# Patient Record
Sex: FEMALE | Race: WHITE | Employment: OTHER | URBAN - METROPOLITAN AREA
[De-identification: names, ages, dates, MRNs, and addresses within clinical notes are randomized per-mention and may not be internally consistent; named-entity substitution may affect disease eponyms.]

---

## 2017-12-17 ENCOUNTER — APPOINTMENT (OUTPATIENT)
Dept: GENERAL RADIOLOGY | Age: 77
End: 2017-12-17
Attending: EMERGENCY MEDICINE
Payer: MEDICARE

## 2017-12-17 ENCOUNTER — HOSPITAL ENCOUNTER (OUTPATIENT)
Age: 77
Discharge: HOME | End: 2017-12-17
Attending: EMERGENCY MEDICINE
Payer: MEDICARE

## 2017-12-17 VITALS
SYSTOLIC BLOOD PRESSURE: 133 MMHG | WEIGHT: 150 LBS | TEMPERATURE: 99 F | OXYGEN SATURATION: 96 % | HEART RATE: 80 BPM | DIASTOLIC BLOOD PRESSURE: 74 MMHG | RESPIRATION RATE: 20 BRPM

## 2017-12-17 DIAGNOSIS — J40 BRONCHITIS: ICD-10-CM

## 2017-12-17 DIAGNOSIS — J45.21 MILD INTERMITTENT ASTHMA WITH EXACERBATION: Primary | ICD-10-CM

## 2017-12-17 PROCEDURE — 71020 XR CHEST PA + LAT CHEST (CPT=71020): CPT | Performed by: EMERGENCY MEDICINE

## 2017-12-17 PROCEDURE — 99204 OFFICE O/P NEW MOD 45 MIN: CPT

## 2017-12-17 PROCEDURE — 94640 AIRWAY INHALATION TREATMENT: CPT

## 2017-12-17 RX ORDER — ALBUTEROL SULFATE 90 UG/1
2 AEROSOL, METERED RESPIRATORY (INHALATION) EVERY 4 HOURS PRN
Qty: 1 INHALER | Refills: 0 | Status: SHIPPED | OUTPATIENT
Start: 2017-12-17 | End: 2018-01-16

## 2017-12-17 RX ORDER — FLUTICASONE PROPIONATE AND SALMETEROL 100; 50 UG/1; UG/1
1 POWDER RESPIRATORY (INHALATION) 2 TIMES DAILY
COMMUNITY

## 2017-12-17 RX ORDER — CITALOPRAM 10 MG/1
10 TABLET ORAL DAILY
COMMUNITY

## 2017-12-17 RX ORDER — PREDNISONE 20 MG/1
40 TABLET ORAL DAILY
Qty: 10 TABLET | Refills: 0 | Status: SHIPPED | OUTPATIENT
Start: 2017-12-17 | End: 2017-12-22

## 2017-12-17 RX ORDER — METHYLPHENIDATE HYDROCHLORIDE 10 MG/1
10 CAPSULE, EXTENDED RELEASE ORAL EVERY MORNING
COMMUNITY

## 2017-12-17 RX ORDER — IPRATROPIUM BROMIDE AND ALBUTEROL SULFATE 2.5; .5 MG/3ML; MG/3ML
3 SOLUTION RESPIRATORY (INHALATION) ONCE
Status: COMPLETED | OUTPATIENT
Start: 2017-12-17 | End: 2017-12-17

## 2017-12-17 RX ORDER — IBUPROFEN 400 MG/1
400 TABLET ORAL EVERY 6 HOURS PRN
COMMUNITY
End: 2022-01-06

## 2017-12-17 RX ORDER — BENZONATATE 200 MG/1
200 CAPSULE ORAL 3 TIMES DAILY PRN
Qty: 30 CAPSULE | Refills: 0 | Status: SHIPPED | OUTPATIENT
Start: 2017-12-17 | End: 2018-01-16

## 2017-12-17 RX ORDER — ALBUTEROL SULFATE 90 UG/1
AEROSOL, METERED RESPIRATORY (INHALATION) EVERY 6 HOURS PRN
COMMUNITY

## 2017-12-17 RX ORDER — LEVOTHYROXINE SODIUM 0.07 MG/1
75 TABLET ORAL
COMMUNITY

## 2017-12-17 RX ORDER — DOXYCYCLINE HYCLATE 100 MG
100 TABLET ORAL 2 TIMES DAILY
Qty: 20 TABLET | Refills: 0 | Status: SHIPPED | OUTPATIENT
Start: 2017-12-17 | End: 2017-12-27

## 2017-12-17 NOTE — ED PROVIDER NOTES
Patient presents with:  Cough/URI    HPI:     Dominic Goff is a 78year old female who presents with chief complaint of cough. Started 2 weeks ago with cough. Cough is now associated with bronchospasm.   Hx of asthma, has not been on steroids in over a ye Normal size cardiac silhouette. MEDIASTINUM:  Normal. PLEURA:  Normal.  No pleural effusions. BONES:  Normal for age. CONCLUSION:   No consolidation. A 5 mm nodule over the superior segment right lower lobe is likely a calcified granuloma.     Dictate

## 2017-12-17 NOTE — ED INITIAL ASSESSMENT (HPI)
Cough for 2 weeks, no fevers, cough keeping her up at night, no sore throat, her  has been sick with a cough too.

## 2021-06-10 ENCOUNTER — TELEPHONE (OUTPATIENT)
Dept: NEPHROLOGY | Age: 81
End: 2021-06-10

## 2022-01-06 ENCOUNTER — HOSPITAL ENCOUNTER (OUTPATIENT)
Age: 82
Discharge: HOME | End: 2022-01-06
Attending: FAMILY MEDICINE
Payer: MEDICARE

## 2022-01-06 VITALS
WEIGHT: 137 LBS | HEART RATE: 64 BPM | SYSTOLIC BLOOD PRESSURE: 160 MMHG | RESPIRATION RATE: 18 BRPM | DIASTOLIC BLOOD PRESSURE: 69 MMHG | TEMPERATURE: 98 F | HEIGHT: 62 IN | BODY MASS INDEX: 25.21 KG/M2 | OXYGEN SATURATION: 94 %

## 2022-01-06 DIAGNOSIS — R11.0 NAUSEA: ICD-10-CM

## 2022-01-06 DIAGNOSIS — R03.0 ELEVATED BLOOD PRESSURE READING: ICD-10-CM

## 2022-01-06 DIAGNOSIS — U07.1 COVID-19 VIRUS INFECTION: Primary | ICD-10-CM

## 2022-01-06 LAB — SARS-COV-2 RNA RESP QL NAA+PROBE: DETECTED

## 2022-01-06 PROCEDURE — 99203 OFFICE O/P NEW LOW 30 MIN: CPT | Performed by: FAMILY MEDICINE

## 2022-01-06 PROCEDURE — U0002 COVID-19 LAB TEST NON-CDC: HCPCS | Performed by: FAMILY MEDICINE

## 2022-01-06 RX ORDER — ONDANSETRON 4 MG/1
4 TABLET, ORALLY DISINTEGRATING ORAL EVERY 8 HOURS PRN
Qty: 6 TABLET | Refills: 0 | Status: SHIPPED | OUTPATIENT
Start: 2022-01-06 | End: 2022-01-08

## 2022-01-06 NOTE — ED PROVIDER NOTES
Patient Seen in: [facility]      History   Patient presents with:  Nausea/vomiting    Stated Complaint: vomitting, fatigue    Subjective:   HPI  This is a 19-year-old female coming in with complaints of 2 episodes of vomiting while she was gary FROM, supple  LUNGS: End expiratory wheeze noted over lower lung fields, thick coarse crackles associated with cough spasms heard  CV: RRR  ABD: not distended  NEURO: Alert and oriented to person place and time  GAIT: Normal        ED Course     Labs Revie 5. Eating small frequent light meals will avoid any stomach issues  6. Breathing exercises 5 minutes each time and do this 3 times per day   7. Monitor pulse Ox at home       Stop methylphenidate.     Disposition and Plan     Clinical Impression:  XMPVA-8

## 2024-05-24 ENCOUNTER — APPOINTMENT (OUTPATIENT)
Dept: GENERAL RADIOLOGY | Age: 84
End: 2024-05-24
Attending: NURSE PRACTITIONER

## 2024-05-24 ENCOUNTER — HOSPITAL ENCOUNTER (OUTPATIENT)
Age: 84
Discharge: HOME | End: 2024-05-24

## 2024-05-24 VITALS
TEMPERATURE: 98 F | RESPIRATION RATE: 18 BRPM | DIASTOLIC BLOOD PRESSURE: 62 MMHG | OXYGEN SATURATION: 95 % | BODY MASS INDEX: 24.84 KG/M2 | HEART RATE: 67 BPM | SYSTOLIC BLOOD PRESSURE: 176 MMHG | HEIGHT: 62 IN | WEIGHT: 135 LBS

## 2024-05-24 DIAGNOSIS — M10.9 ACUTE GOUT INVOLVING TOE OF RIGHT FOOT, UNSPECIFIED CAUSE: Primary | ICD-10-CM

## 2024-05-24 PROCEDURE — 99204 OFFICE O/P NEW MOD 45 MIN: CPT | Performed by: NURSE PRACTITIONER

## 2024-05-24 PROCEDURE — 73660 X-RAY EXAM OF TOE(S): CPT | Performed by: NURSE PRACTITIONER

## 2024-05-24 RX ORDER — FLUOXETINE HYDROCHLORIDE 40 MG/1
40 CAPSULE ORAL DAILY
COMMUNITY
Start: 2022-05-14

## 2024-05-24 RX ORDER — METHYLPREDNISOLONE 4 MG/1
TABLET ORAL
Qty: 1 EACH | Refills: 0 | Status: SHIPPED | OUTPATIENT
Start: 2024-05-24

## 2024-05-24 RX ORDER — FAMOTIDINE 40 MG/1
40 TABLET, FILM COATED ORAL 2 TIMES DAILY
COMMUNITY
Start: 2022-04-19

## 2024-05-24 RX ORDER — SOLIFENACIN SUCCINATE 10 MG/1
10 TABLET, FILM COATED ORAL DAILY
COMMUNITY
Start: 2024-03-15

## 2024-05-24 NOTE — ED PROVIDER NOTES
Patient Seen in: [facility]      History     Chief Complaint   Patient presents with    Toe Pain     Stated Complaint: toe issues    Subjective:   This is an 83-year-old female with below stated medical history.  Presents to immediate care for right great toe pain.  Reports toe became red and swollen 1 week ago.  Denies any trauma or injury.  Denies any numbness or tingling.  Denies any treatment prior to arrival.    The history is provided by the patient.           Objective:   No pertinent past medical history.            No pertinent past surgical history.              No pertinent social history.            Review of Systems   Constitutional:  Negative for chills and fever.   HENT:  Negative for congestion and sore throat.    Respiratory:  Negative for cough.    Cardiovascular:  Negative for chest pain and leg swelling.   Gastrointestinal:  Negative for abdominal pain.   Musculoskeletal:  Positive for arthralgias and joint swelling. Negative for back pain, neck pain and neck stiffness.   Skin:  Negative for rash.   Allergic/Immunologic: Negative for environmental allergies.   Neurological:  Negative for numbness and headaches.   Hematological:  Does not bruise/bleed easily.       Positive for stated complaint: toe issues  Other systems are as noted in HPI.  Constitutional and vital signs reviewed.      All other systems reviewed and negative except as noted above.    Physical Exam     ED Triage Vitals [05/24/24 1606]   BP (!) 176/62   Pulse 67   Resp 18   Temp 98.2 °F (36.8 °C)   Temp src Temporal   SpO2 95 %   O2 Device None (Room air)       Current Vitals:   Vital Signs  BP: (!) 176/62  Pulse: 67  Resp: 18  Temp: 98.2 °F (36.8 °C)  Temp src: Temporal    Oxygen Therapy  SpO2: 95 %  O2 Device: None (Room air)            Physical Exam  Vitals and nursing note reviewed.   Constitutional:       General: She is not in acute distress.     Appearance: Normal appearance. She is not ill-appearing,  toxic-appearing or diaphoretic.   HENT:      Head: Normocephalic and atraumatic.      Right Ear: External ear normal.      Left Ear: External ear normal.      Nose: Nose normal.      Mouth/Throat:      Mouth: Mucous membranes are moist.      Pharynx: Oropharynx is clear.   Eyes:      General:         Right eye: No discharge.         Left eye: No discharge.      Extraocular Movements: Extraocular movements intact.      Conjunctiva/sclera: Conjunctivae normal.   Cardiovascular:      Rate and Rhythm: Normal rate.   Pulmonary:      Effort: Pulmonary effort is normal.   Musculoskeletal:      Cervical back: Neck supple.      Right lower leg: No edema.      Left lower leg: No edema.        Feet:    Feet:      Left foot:      Skin integrity: Erythema present. No ulcer, blister, skin breakdown, warmth, callus, dry skin or fissure.   Skin:     General: Skin is warm and dry.      Capillary Refill: Capillary refill takes less than 2 seconds.      Findings: No rash.   Neurological:      Mental Status: She is alert and oriented to person, place, and time.   Psychiatric:         Mood and Affect: Mood normal.         Behavior: Behavior normal.               ED Course   Labs Reviewed - No data to display          X-ray of the right great toe         MDM      Vital signs stable.  Patient is well-appearing and nontoxic looking.  Presents to immediate care for right great toe pain and swelling.    Differential diagnosis includes was not limited to gout, cellulitis, paronychia, abscess, less likely hardware malfunction or osteomyelitis    Across the MP joint of the right great toe is erythemic and swollen.  Tender to touch.  Distal CMS intact.  No obvious open wounds.  No suggestion of infectious process.    X-ray films interpreted and reviewed by myself.  Results show moderate degenerative changes involving the first MTP joint.  Hardware is intact.  No acute findings.    Clinical impression is gout    DC home.  RX given for Medrol  Dosepak.  Recommended close follow-up with podiatry.  Reasons to return reviewed.  She verbalized understanding, agreed with plan of care.  All questions answered.                                 Medical Decision Making      Disposition and Plan     Clinical Impression:  1. Acute gout involving toe of right foot, unspecified cause         Disposition:  Discharge  5/24/2024  5:06 pm    Follow-up:  Lance Archer MD  [address]  [facility] [address] [phone]    In 1 week      [facility]   [address]  [address]  [facility] [id number]  In 1 week            Medications Prescribed:  Current Discharge Medication List        START taking these medications    Details   methylPREDNISolone (MEDROL) 4 MG Oral Tablet Therapy Pack Dosepack: take as directed  Qty: 1 each, Refills: 0

## 2024-07-28 ENCOUNTER — HOSPITAL ENCOUNTER (OUTPATIENT)
Age: 84
Discharge: HOME | End: 2024-07-28
Payer: MEDICARE

## 2024-07-28 ENCOUNTER — APPOINTMENT (OUTPATIENT)
Dept: CT IMAGING | Age: 84
End: 2024-07-28
Attending: NURSE PRACTITIONER
Payer: MEDICARE

## 2024-07-28 VITALS
OXYGEN SATURATION: 98 % | DIASTOLIC BLOOD PRESSURE: 58 MMHG | HEART RATE: 81 BPM | WEIGHT: 135 LBS | TEMPERATURE: 99 F | BODY MASS INDEX: 24.84 KG/M2 | HEIGHT: 62 IN | RESPIRATION RATE: 18 BRPM | SYSTOLIC BLOOD PRESSURE: 158 MMHG

## 2024-07-28 DIAGNOSIS — S09.90XA INJURY OF HEAD, INITIAL ENCOUNTER: Primary | ICD-10-CM

## 2024-07-28 PROCEDURE — 99214 OFFICE O/P EST MOD 30 MIN: CPT | Performed by: NURSE PRACTITIONER

## 2024-07-28 PROCEDURE — 70450 CT HEAD/BRAIN W/O DYE: CPT | Performed by: NURSE PRACTITIONER

## 2024-07-28 PROCEDURE — 12001 RPR S/N/AX/GEN/TRNK 2.5CM/<: CPT | Performed by: NURSE PRACTITIONER

## 2024-07-28 NOTE — ED INITIAL ASSESSMENT (HPI)
Pt sts fell out of bed this morning during an active dream. Hit back of head possibly on the nightstand. Laceration to left side of back of head. Denies nausea, dizziness, lightheaded.

## 2024-07-28 NOTE — ED PROVIDER NOTES
Patient Seen in: [facility]      History   No chief complaint on file.    Stated Complaint: fell out of bed, head laceration    Subjective:   83-year-old female presents today with recently falling out of bed while taking a nap.  States did strike the left posterior aspect of the head and has 2 wounds.  Bleeding is controlled.  Denies any nausea vomiting or dizziness.  Denies any headaches.  Does has pain to the area of the lacerations.  Denies use of blood thinning agents.  Denies any other injuries or concerns.  The patient's medication list, past medical history and social history elements as listed in today's nurse's notes were reviewed and agreed (except as otherwise stated in the HPI).  The patient's family history reviewed and determined to be noncontributory to the presenting problem            Objective:   Past Medical History:    ADHD    Asthma (HCC)    Depression    Renal failure    stage 3    Thyroid disease              Past Surgical History:   Procedure Laterality Date    Cataract Bilateral     Foot surgery Bilateral                 No pertinent social history.            Review of Systems    Positive for stated Chief Complaint: No chief complaint on file.    Other systems are as noted in HPI.  Constitutional and vital signs reviewed.      All other systems reviewed and negative except as noted above.    Physical Exam     ED Triage Vitals [07/28/24 1444]   BP (!) 165/67   Pulse 86   Resp 18   Temp 98.6 °F (37 °C)   Temp src Temporal   SpO2 94 %   O2 Device None (Room air)       Current Vitals:   Vital Signs  BP: 158/58  Pulse: 81  Resp: 18  Temp: 98.6 °F (37 °C)  Temp src: Temporal    Oxygen Therapy  SpO2: 98 %  O2 Device: None (Room air)            Physical Exam  Vitals and nursing note reviewed.   Constitutional:       Appearance: Normal appearance.   HENT:      Head:      Comments: 1 cm laceration which is somewhat gaping to the crown of the left posterior scalp.  Patient has a second  laceration approximately 0.5 cm in length that is more superficial.  Bleeding is controlled.  Neurological:      General: No focal deficit present.      Mental Status: She is alert and oriented to person, place, and time.      GCS: GCS eye subscore is 4. GCS verbal subscore is 5. GCS motor subscore is 6.      Cranial Nerves: No facial asymmetry.      Sensory: No sensory deficit.      Motor: Motor function is intact. No weakness.      Coordination: Coordination is intact.      Gait: Gait is intact.               ED Course   Labs Reviewed - No data to display               CT BRAIN OR HEAD (CPT=70450)    Result Date: 7/28/2024  PROCEDURE:  CT BRAIN OR HEAD (70740)  COMPARISON:  None.  INDICATIONS:  fell out of bed, head laceration, head pain  TECHNIQUE:  Noncontrast CT scanning is performed through the brain. Dose reduction techniques were used. Dose information is transmitted to the ACR (American College of Radiology) NRDR (National Radiology Data Registry) which includes the Dose Index Registry.  PATIENT STATED HISTORY: (As transcribed by Technologist)  Patient states she was having a vivid dream last night and fell out of bed. Patient states she must have hit her bead side table. Patient has cut to left posterior head and bruising and swelling to right forehead. Patinet denies nausea, vomitng, visual changes or headache.    FINDINGS:  VENTRICLES/SULCI:  Mild atrophy is specially involving the cerebellum.  No overt hydrocephalus.  Sagittal midline images are unremarkable. INTRACRANIAL:  Moderate to severe chronic deep white matter ischemic change.  No acute territorial infarction.  No hemorrhage or mass.  No extra-axial fluid collections.  No midline shift.  The basal cisterns are patent. SINUSES:           No sign of acute sinusitis.  MASTOIDS:          No sign of acute inflammation. SKULL:             No evidence for fracture or osseous abnormality. OTHER:             Small right frontal scalp hematoma.             CONCLUSION:  Chronic changes.  No acute disease.    LOCATION:  [place]   Dictated by (CST): Gabriel Lu MD on 2024 at 3:44 PM     Finalized by (CST): Gabriel Lu MD on 2024 at 3:48 PM         MDM     Please note that this report has been produced using speech recognition software and may contain errors related to that system including, but not limited to, errors in grammar, punctuation, and spelling, as well as words and phrases that possibly may have been recognized inappropriately.  If there are any questions or concerns, contact the dictating provider for clarification.                                         Medical Decision Making  Differential diagnosis includes but is not limited to: Minor head injury, scalp laceration, concussion, ICB, cranial fracture        Presented today history of head injury after falling out of her bed.  Had 2 lacerations to the scalp.  Bleeding is controlled at this time.  Wound was irrigated and cleaned and staples were placed see procedure note.  CT scan of the brain showed no cranial fracture or no cranial bleeding.  Patient to return here in 1 week to have staples removed.  Head injury cautions and instructions given.  To take Tylenol for pain.  Wound care instructions given.  Patient verbalized understanding and agreed to plan of care      Procedure: Suturing/Laceration repair  Procedure note:  Verbal consent was obtained from the patient/parent. Patient's name,  and site of procedure was confirmed  Wound was washed thoroughly and explored for any foreign bodies. No foreign bodies identified  Anesthetic used: 1 % Lidocaine  Type of anesthesia: local  Type of suture material: Pulls  Suturing technique: simple interrupted   Number of sutures: Total 3.    Result: wound approximated well  Patient tolerated procedure well  Wound was dressed with Neosporin ointment and gauze dressing applied  Tetanus status: Declines  Antiobitic prophylaxis: none  Suture  removal in 7 days  Wound care instructions given. Keep affected area keep and clean  Monitor for infection  Return to clinic if infection suspected  All results reviewed and discussed with patient.  See AVS for detailed discharge instructions for your condition today.     Amount and/or Complexity of Data Reviewed  Radiology: ordered and independent interpretation performed. Decision-making details documented in ED Course.     Details: CT brain    Risk  OTC drugs.        Disposition and Plan     Clinical Impression:  1. Injury of head, initial encounter         Disposition:  Discharge  7/28/2024  3:49 pm    Follow-up:  Ranjith Wood  [address] [id number] [phone]    In 1 week  As needed          Medications Prescribed:  Current Discharge Medication List

## 2024-08-04 ENCOUNTER — HOSPITAL ENCOUNTER (OUTPATIENT)
Age: 84
Discharge: HOME | End: 2024-08-04
Payer: MEDICARE

## 2024-08-04 VITALS
OXYGEN SATURATION: 98 % | HEART RATE: 73 BPM | SYSTOLIC BLOOD PRESSURE: 161 MMHG | TEMPERATURE: 98 F | RESPIRATION RATE: 18 BRPM | DIASTOLIC BLOOD PRESSURE: 75 MMHG

## 2024-08-04 DIAGNOSIS — Z48.02 ENCOUNTER FOR STAPLE REMOVAL: Primary | ICD-10-CM

## 2024-08-04 NOTE — ED PROVIDER NOTES
Patient Seen in: [facility]      History     Chief Complaint   Patient presents with    Staple Removal     Stated Complaint: staple removal, head    Subjective:   This is an 83-year-old female with below stated medical history.  Presents to immediate care for staple removal from the scalp.  Staples were placed 8 days ago.  Patient denies any headaches, vomiting, or confusion.  No drainage from the wound or fevers.    The history is provided by the patient.           Objective:   Past Medical History:    ADHD    Asthma (HCC)    Depression    Renal failure    stage 3    Thyroid disease              Past Surgical History:   Procedure Laterality Date    Cataract Bilateral     Foot surgery Bilateral                 Social History     Socioeconomic History    Marital status:    Tobacco Use    Smoking status: Never    Smokeless tobacco: Never     Social Determinants of Health     Food Insecurity: Low Risk  (10/3/2022)    Received from [facility]    Food Insecurity     Have there been times that your food ran out, and you didn't have money to get more?: No     Are there times that you worry that this might happen?: No   Transportation Needs: Low Risk  (10/3/2022)    Received from [facility]    Transportation Needs     Do you have trouble getting transportation to medical appointments?: No   Housing Stability: Low Risk  (10/3/2022)    Received from [facility]    Housing Stability     Are you concerned about having a safe and reliable place to live?: No              Review of Systems   Constitutional:  Negative for fever.   HENT:  Negative for congestion.    Respiratory:  Negative for cough.    Cardiovascular:  Negative for chest pain.   Gastrointestinal:  Negative for abdominal pain, nausea and vomiting.   Genitourinary:  Negative for dysuria.   Musculoskeletal:  Negative for back pain, neck pain and neck stiffness.   Skin:  Positive for  wound.   Neurological:  Negative for dizziness, tremors, seizures, syncope, facial asymmetry, speech difficulty, weakness, light-headedness, numbness and headaches.       Positive for stated Chief Complaint: Staple Removal    Other systems are as noted in HPI.  Constitutional and vital signs reviewed.      All other systems reviewed and negative except as noted above.    Physical Exam     ED Triage Vitals [08/04/24 1209]   BP (!) 161/75   Pulse 73   Resp 18   Temp 98.1 °F (36.7 °C)   Temp src Temporal   SpO2 98 %   O2 Device None (Room air)       Current Vitals:   Vital Signs  BP: (!) 161/75  Pulse: 73  Resp: 18  Temp: 98.1 °F (36.7 °C)  Temp src: Temporal    Oxygen Therapy  SpO2: 98 %  O2 Device: None (Room air)            Physical Exam  Vitals and nursing note reviewed.   Constitutional:       General: She is not in acute distress.     Appearance: Normal appearance. She is not ill-appearing, toxic-appearing or diaphoretic.   HENT:      Head: Normocephalic. Laceration present.        Right Ear: External ear normal.      Left Ear: External ear normal.      Nose: Nose normal.      Mouth/Throat:      Mouth: Mucous membranes are moist.      Pharynx: Oropharynx is clear.   Eyes:      General:         Right eye: No discharge.         Left eye: No discharge.      Extraocular Movements: Extraocular movements intact.      Conjunctiva/sclera: Conjunctivae normal.   Cardiovascular:      Rate and Rhythm: Normal rate.   Pulmonary:      Effort: Pulmonary effort is normal.   Musculoskeletal:      Cervical back: Neck supple.      Right lower leg: No edema.      Left lower leg: No edema.   Skin:     General: Skin is warm and dry.      Capillary Refill: Capillary refill takes less than 2 seconds.      Findings: No rash.   Neurological:      General: No focal deficit present.      Mental Status: She is alert and oriented to person, place, and time.   Psychiatric:         Mood and Affect: Mood normal.         Behavior: Behavior  normal.               ED Course   Labs Reviewed - No data to display          Staple removal         MDM        Vital signs stable.  Patient is well-appearing and nontoxic looking.  Presents to immediate care for staple removal from the scalp.    No red flags of head injury.  3 staples in place.  Wound appears to be healing well with no evidence of infection.    3 staples were removed.  Patient tolerated well.    DC home.  PCP follow-up as needed.                                   Medical Decision Making      Disposition and Plan     Clinical Impression:  1. Encounter for staple removal         Disposition:  Discharge  8/4/2024 12:15 pm    Follow-up:  Ranjith Wood  [address] [id number] [phone]      As needed          Medications Prescribed:  Discharge Medication List as of 8/4/2024 12:17 PM